# Patient Record
Sex: FEMALE | NOT HISPANIC OR LATINO | URBAN - METROPOLITAN AREA
[De-identification: names, ages, dates, MRNs, and addresses within clinical notes are randomized per-mention and may not be internally consistent; named-entity substitution may affect disease eponyms.]

---

## 2017-02-24 ENCOUNTER — OFFICE VISIT - HEALTHEAST (OUTPATIENT)
Dept: FAMILY MEDICINE | Facility: CLINIC | Age: 32
End: 2017-02-24

## 2017-02-24 DIAGNOSIS — R21 RASH AND NONSPECIFIC SKIN ERUPTION: ICD-10-CM

## 2017-02-24 DIAGNOSIS — Z13.220 SCREENING FOR LIPID DISORDERS: ICD-10-CM

## 2017-02-24 DIAGNOSIS — Z76.89 ENCOUNTER TO ESTABLISH CARE: ICD-10-CM

## 2017-02-24 DIAGNOSIS — Z13.1 SCREENING FOR DIABETES MELLITUS: ICD-10-CM

## 2017-02-24 DIAGNOSIS — J02.9 SORE THROAT: ICD-10-CM

## 2017-02-24 LAB
CHOLEST SERPL-MCNC: 150 MG/DL
FASTING STATUS PATIENT QL REPORTED: YES
HDLC SERPL-MCNC: 37 MG/DL
LDLC SERPL CALC-MCNC: 72 MG/DL
TRIGL SERPL-MCNC: 203 MG/DL

## 2017-02-24 RX ORDER — DIAPER,BRIEF,INFANT-TODD,DISP
EACH MISCELLANEOUS
Qty: 30 G | Refills: 0 | Status: SHIPPED | OUTPATIENT
Start: 2017-02-24

## 2017-02-24 ASSESSMENT — MIFFLIN-ST. JEOR: SCORE: 1493.54

## 2017-03-10 ENCOUNTER — AMBULATORY - HEALTHEAST (OUTPATIENT)
Dept: NURSING | Facility: CLINIC | Age: 32
End: 2017-03-10

## 2017-03-10 DIAGNOSIS — Z23 NEED FOR IMMUNIZATION AGAINST INFLUENZA: ICD-10-CM

## 2017-07-20 ENCOUNTER — COMMUNICATION - HEALTHEAST (OUTPATIENT)
Dept: FAMILY MEDICINE | Facility: CLINIC | Age: 32
End: 2017-07-20

## 2017-07-21 ENCOUNTER — COMMUNICATION - HEALTHEAST (OUTPATIENT)
Dept: TELEHEALTH | Facility: CLINIC | Age: 32
End: 2017-07-21

## 2017-07-21 ENCOUNTER — OFFICE VISIT - HEALTHEAST (OUTPATIENT)
Dept: FAMILY MEDICINE | Facility: CLINIC | Age: 32
End: 2017-07-21

## 2017-07-21 DIAGNOSIS — J02.9 PHARYNGITIS: ICD-10-CM

## 2018-08-20 ENCOUNTER — COMMUNICATION - HEALTHEAST (OUTPATIENT)
Dept: FAMILY MEDICINE | Facility: CLINIC | Age: 33
End: 2018-08-20

## 2018-08-24 ENCOUNTER — OFFICE VISIT - HEALTHEAST (OUTPATIENT)
Dept: FAMILY MEDICINE | Facility: CLINIC | Age: 33
End: 2018-08-24

## 2018-08-24 DIAGNOSIS — Z11.1 SCREENING EXAMINATION FOR PULMONARY TUBERCULOSIS: ICD-10-CM

## 2018-08-24 ASSESSMENT — MIFFLIN-ST. JEOR: SCORE: 1549.1

## 2018-08-27 LAB
GAMMA INTERFERON BACKGROUND BLD IA-ACNC: 0.06 IU/ML
M TB IFN-G BLD-IMP: NEGATIVE
MITOGEN IGNF BCKGRD COR BLD-ACNC: 0.01 IU/ML
MITOGEN IGNF BCKGRD COR BLD-ACNC: 0.01 IU/ML
QTF INTERPRETATION: NORMAL
QTF MITOGEN - NIL: >10 IU/ML

## 2019-05-23 ENCOUNTER — COMMUNICATION - HEALTHEAST (OUTPATIENT)
Dept: FAMILY MEDICINE | Facility: CLINIC | Age: 34
End: 2019-05-23

## 2021-05-29 NOTE — TELEPHONE ENCOUNTER
Called patient left message to call clinic back. Patient is due for physical - pap smear . If patient calls back please help schedule appointment with .     Thank you

## 2021-05-29 NOTE — TELEPHONE ENCOUNTER
Pt does not have insurance at the moment, she will call back to schedule when insurance is active.

## 2021-05-30 VITALS — BODY MASS INDEX: 30.43 KG/M2 | WEIGHT: 178.25 LBS | HEIGHT: 64 IN

## 2021-05-31 VITALS — WEIGHT: 181 LBS | BODY MASS INDEX: 31.07 KG/M2

## 2021-06-01 VITALS — WEIGHT: 190.5 LBS | BODY MASS INDEX: 32.52 KG/M2 | HEIGHT: 64 IN

## 2021-06-09 NOTE — PROGRESS NOTES
"ASSESMENT AND PLAN:  Diagnoses and all orders for this visit:    Encounter to establish care    Sore throat  -     Rapid Strep A Screen-Throat  -     Group A Strep, RNA Direct Detection, Throat  Negative rapid strep.  Tylenol as needed for fever and pain.    Rash and nonspecific skin eruption  -     hydrocortisone 0.5 % cream; Apply thin layer to affected area twice a day  Dispense: 30 g; Refill: 0  F/U if no improvement.     Screening for diabetes mellitus  -     Glucose    Screening for lipid disorders  -     Lipid Cascade        SUBJECTIVE: Elisabeth Rose is a 31-year-old female here to establish care.    She is complaining of dry skin, rashes and pruritus on her left breast and abdomen on and off for the past 2 weeks.  Denies rash in other parts of her body.  No known history of allergy.  No known history of eczema.  She is breast-feeding her 16-month-old daughter.  No history of asthma.    She has some sore throat with subjective fever fever and runny nose for about a week.  Some family members are also sick with the same symptoms.  She has some mild body ache.  No nausea, vomiting, diarrhea or abdominal pain.    She is also requesting blood glucose and cholesterol check.  No known family history of diabetes or hyperlipidemia.  No known personal history of gestational diabetes.      No past medical history on file.  There is no problem list on file for this patient.      Allergies:  No Known Allergies    History   Smoking Status     Never Smoker   Smokeless Tobacco     Never Used       Review of systems otherwise negative except as listed in HPI.   History   Smoking Status     Never Smoker   Smokeless Tobacco     Never Used       OBJECTICE:   Visit Vitals     /74     Pulse 86     Temp 97.9  F (36.6  C) (Oral)     Resp 16     Ht 5' 4\" (1.626 m)     Wt 178 lb 4 oz (80.9 kg)     SpO2 98%     Breastfeeding Yes     BMI 30.6 kg/m2       DATA REVIEWED:    Labs Reviewed or Ordered (1):     GEN-alert,  in no " apparent distress.  HEENT-mucous membranes are moist, neck is supple.  CV-regular rate and rhythm with no murmur.   RESP-lungs clear to auscultation .  ABDOMEN- Soft , not tender.  SKIN-no rashes on today's exam.       Blake Victor   2/24/2017   This transcription uses voice recognition software, which may contain typographical errors.

## 2021-06-12 NOTE — PROGRESS NOTES
Subjective:    Elisabeth Joyce is a 31 y.o. female who presents for evaluation of sore throat.  She was seen at [facility] ER on 7/19/2017 for a sore throat.  I reviewed that note and lab results today.  Rapid strep and strep culture were both negative.  She was discharged from the ER with viscous lidocaine to use symptomatically.  ER exam noted tonsils were 3+ bilaterally.  Today, patient says she feels like she is slightly better, but her sore throat is still bothersome.  It hurts to swallow.  No fevers.  No cough.  No known sick exposures.  She has been unable to take the throat medicine because it tastes bad.  She has not had any new symptoms since being seen at the ER.      Current Outpatient Prescriptions:      hydrocortisone 0.5 % cream, Apply thin layer to affected area twice a day, Disp: 30 g, Rfl: 0     methylPREDNISolone (MEDROL DOSEPACK) 4 mg tablet, follow package directions, Disp: 21 tablet, Rfl: 0     ondansetron (ZOFRAN ODT) 4 MG disintegrating tablet, Take 1 tablet (4 mg total) by mouth every 8 (eight) hours as needed for nausea (As needed for nausea)., Disp: 6 tablet, Rfl: 0     viscous lidocaine HC (LIDOCAINE) 2 % Soln viscous solution, Take 5 mL by mouth every 3 (three) hours as needed (throat pain)., Disp: 1 Bottle, Rfl: 0     Objective:   Allergies:  Review of patient's allergies indicates no known allergies.    Vitals:  Vitals:    07/21/17 0946   BP: 104/66   Pulse: 74   Resp: 16   Temp: 97.9  F (36.6  C)   SpO2: 100%     Body mass index is 31.07 kg/(m^2).    Vital signs reviewed.  General: Patient is alert and oriented x 3, in no apparent distress  Ears: TMs are non-erythematous with good light reflex bilaterally  Throat: no erythema or exudate noted, tonsils are 1-2+ bilaterally  Lymphatic: no anterior cervical lymph node enlargement  Cardiac: regular rate and rhythm, no murmurs  Pulmonary: lungs clear to auscultation bilaterally, no crackles, rales, rhonchi, or wheezing  noted    Results for orders placed or performed during the hospital encounter of 07/19/17   Rapid strep screen   Result Value Ref Range    Rapid Strep A Antigen No Group A Strep detected, presumptive negative No Group A Strep detected, presumptive negative   Group A Strep, RNA Direct Detection, Throat   Result Value Ref Range    Group A Strep by PCR No Group A Strep rRNA detected No Group A Strep rRNA detected     Assessment and Plan:   1.  Follow-up ER for pharyngitis.  Both strep tests at the ER were negative.  Patient feels she may be getting a little better.  No new symptoms.  I concur with the ER that she most likely has a viral illness.  I reviewed continued symptomatic treatment.  Because patient continues to be bothered by her throat, and it is the weekend, prescription sent for Medrol Dosepak to use if needed for symptomatic relief.  Patient can watch this for a few more days and see if he gets better on its own first.    This dictation uses voice recognition software, which may contain typographical errors.

## 2021-06-20 NOTE — PROGRESS NOTES
Assessment: /    Plan:    1. Screening examination for pulmonary tuberculosis  QTF-Mycobacterium tuberculosis by [facility]       [facility]      Subjective:    HPI:  Elisabeth Joyce is a 32-year-old female presenting for TB screening.  She has never been treated for TB.  She does not know anyone who has TB.    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    Review of Systems: No fever, cough, sweats, weight loss.      Current Outpatient Prescriptions   Medication Sig Dispense Refill     hydrocortisone 0.5 % cream Apply thin layer to affected area twice a day 30 g 0     methylPREDNISolone (MEDROL DOSEPACK) 4 mg tablet follow package directions 21 tablet 0     ondansetron (ZOFRAN ODT) 4 MG disintegrating tablet Take 1 tablet (4 mg total) by mouth every 8 (eight) hours as needed for nausea (As needed for nausea). 6 tablet 0     viscous lidocaine HC (LIDOCAINE) 2 % Soln viscous solution Take 5 mL by mouth every 3 (three) hours as needed (throat pain). 1 Bottle 0     No current facility-administered medications for this visit.          Objective:    Vitals:    08/24/18 1437   BP: 110/64   Pulse: 84   Resp: 17   Temp: 98.4  F (36.9  C)   SpO2: 98%       Gen:  NAD, VSS  Lungs:  normal  Heart:  normal          ADDITIONAL HISTORY SUMMARIZED (2): None.  DECISION TO OBTAIN EXTRA INFORMATION (1): None.   RADIOLOGY TESTS (1): None.  LABS (1): [facility].  MEDICINE TESTS (1): None.  INDEPENDENT REVIEW (2 each): None.     Total Data Points: 1